# Patient Record
(demographics unavailable — no encounter records)

---

## 2017-11-28 NOTE — PR
[facility]
                                    [address]
                                  [place], [place]  [address]
_________________________________________________________________________________________
                                                                 Signed   
 
 
===================================
PP Progress Notes
===================================
Datetime Report Generated by CPN: 2017 07:44
   
SUBJECTIVE:  [id number]
Pain:  Within normal limits
Nausea/Vomiting:  Denies
Vital Signs:  [id number]
Vital Signs:  Reviewed
Notable Details:  intermittent mild HTN
POSTPARTUM EXAM:  [id number]
Cardiovascular:  Normal
Respiratory:  Normal
Abdomen/Uterus:  Abnormal
Lochia:  Normal
Vulva/Perineum:  Not Done
Breasts:  Not Done
CVA Tenderness:  Not Done
Extremities:  Normal
 Incision:  Normal
Breastfeeding Progress:  Not Applicable
Exam Comments:  Abdomen with active BS.  Fundus firm, NT @ U-1.
      
   H/H 30.8, WBC 17.9, plat 207k
IMPRESSION/PLAN/PROCEDURES:  [id number]
Impression:  Normal postpartum progression
Other Plans:  increase ambulation, shower
Procedures:  None
Progress Notes:  Doing well.  Will continue current care.
Signing Physician:  Sia Saravia MD
 
 
CC:                                                              
 
 
 
 
 
 
 
 
 
*Electronically Signed*  17  0744  SIA SARAVIA MD            
                                                                       
_________________________________________________________________________________________
PATIENT NAME:     JOSE STEPHEN                         
MEDICAL RECORD #: [id number]                     PROGRESS NOTE                 
          ACCT #: [id number]  
DATE OF BIRTH:   86                                       
PHYSICIAN:   SIA SARAVIA MD                   RPT #: [id number]
REPORT IS CONFIDENTIAL AND NOT TO BE RELEASED WITHOUT AUTHORIZATION

## 2017-11-29 NOTE — PR
[facility]
                                    [address]
                                  [place], [place]  [address]
_________________________________________________________________________________________
                                                                 Signed   
 
 
===================================
PP Progress Notes
===================================
Datetime Report Generated by CPN: 2017 09:57
   
SUBJECTIVE:  [id number]
Pain:  Within normal limits
Nausea/Vomiting:  Denies
Flatus:  Yes
Bowel Movement:  Yes
Vital Signs:  [id number]
Vital Signs:  Reviewed
Notable Details:  mild HTN
POSTPARTUM EXAM:  [id number]
Cardiovascular:  Not Done
Respiratory:  Not Done
Abdomen/Uterus:  Abnormal
Lochia:  Normal
Vulva/Perineum:  Not Done
Breasts:  Not Done
CVA Tenderness:  Not Done
Extremities:  Normal
 Incision:  Normal
Breastfeeding Progress:  Not Applicable
Exam Comments:  Abdomen with active BS.  Fundus firm, NT @ U-1.
IMPRESSION/PLAN/PROCEDURES:  [id number]
Impression:  Normal postpartum progression; Pregnancy Induced Hypertension
Plan:  Remove staples; Discharge
Other Plans:  increase ambulation, shower
Procedures:  None
Progress Notes:  Doing well though BPs still slightly increased.  She is ready for D/C.  
Signing Physician:  Sia Saravia MD
 
 
CC:                                                              
 
 
 
 
 
 
 
 
*Electronically Signed*  17  0957  SIA SARAVIA MD            
                                                                       
_________________________________________________________________________________________
PATIENT NAME:     JOSE STEPHEN                         
MEDICAL RECORD #: [id number]                     PROGRESS NOTE                 
          ACCT #: [id number]  
DATE OF BIRTH:   86                                       
PHYSICIAN:   SIA SARAVIA MD                   RPT #: [id number]
REPORT IS CONFIDENTIAL AND NOT TO BE RELEASED WITHOUT AUTHORIZATION

## 2017-12-04 NOTE — OR
[facility]
                                    [address] [place], [place]  [address]
_________________________________________________________________________________________
                                                                 Signed   
 
 
DATE OF OPERATION:
2017
 
SURGEON: Sia Saravia MD
 
FIRST ASSISTANT: Dr. Coronado.
 
PREOPERATIVE DIAGNOSIS: Post-dates pregnancy, non-reassuring fetal status.
 
POSTOPERATIVE DIAGNOSIS: Post-dates pregnancy, non-reassuring fetal status, delivered.
 
PROCEDURE: Primary  section with low segment transverse uterine incision.
 
ANESTHESIA: Spinal.
 
ESTIMATED BLOOD LOSS: 700 mL.
 
DRAINS: Miller catheter.
 
INDICATIONS AND FINDINGS: The patient is a 31-year-old female,  1, para 0,
admitted at 41 weeks for 
induction secondary to postdates.  Her cervix was unfavorable on admission.  She received
one Cytotec after reassuring fetal heart tracing.  However, when she was put back on the 
monitor for the 2nd Cytotec, she was having a prolonged deceleration.  These continued 
despite the fact that she was hardly damon at all. It was felt that the baby 
would not tolerate labor, as she was still closed and there was no indication of the 
delivery that would occur in the near future.  She was counseled and then taken to the OR
for the primary  section.  She was then delivered of a little girl via lower 
segment transverse uterine incision from the ROT position with Apgars of 8, 9 and weight 
of 7 pounds 13 ounces.  There was thick meconium at delivery.  The uterus had evidence of
fibroids in the mid fundus as well as a subserosal fibroid, left superior fundus.
 
PROCEDURE IN DETAIL: The patient was prepped and draped in the supine position.  A
Pfannenstiel skin incision 
was made and carried down through the fascia.  The incision was extended laterally.  The 
inferior and superior fascial flaps were then created.  The peritoneum was opened bluntly
and the incision extended superiorly and inferiorly.  The Romulo retractor was then 
placed.  The incision was made at the superior aspect of the peritoneal reflection.  The 
lower segment was fairly narrow.  The incision was extended bluntly.  The membranes were 
ruptured artificially and the baby delivered with the above findings and handed off to 
the pediatrician in attendance.  Cord gases were obtained as well as cord blood.  The 
placenta was removed manually and the uterus explored with a lap tape showing no 
 
    Electronically Signed By: SIA SARAVIA MD  17 0844
_________________________________________________________________________________________
PATIENT NAME:     JOSE STEPHEN                          
MEDICAL RECORD #: [id number]                     OPERATIVE REPORT              
          ACCT #: [id number]  
DATE OF BIRTH:   86                                       
PHYSICIAN:   SIA SARAVIA MD                 REPORT #: [id number]
REPORT IS CONFIDENTIAL AND NOT TO BE RELEASED WITHOUT AUTHORIZATION
 
 
                                  [facility]
                                    [address] [place] [address]  [address]
_________________________________________________________________________________________
                                                                 Signed   
 
 
remaining fragments.  The edges of the incision were identified and the uterus was closed
in two layers.  The first layer was a running and locking stitch and the second was a 
vertical imbricating stitch.  There were no extensions to the incision.  The abdomen was 
then copiously irrigated, inspected and good hemostasis was again noted.  The retractor 
was removed and the peritoneum identified.  A cell graft was laid over the lower segment 
to aid in healing.  The peritoneum was then closed in a running suture of 3-0 Vicryl.  
The muscles were brought together with interrupted sutures of 0 Vicryl.  Bleeding points 
in the fascia were controlled with cautery.  ACell powder was sprinkled over the muscles 
to aid in healing.  The fascia was then closed from each angle to the midline with a 
running suture of 0 Vicryl.  The subcutaneous tissue was irrigated and bleeding points 
controlled.  Because of the depth of the tissue, another ACell powder was sprinkled in 
the subcu and interrupted sutures of 3-0 Vicryl were placed and closed in the deep space.
 The skin was closed with staples.  All sponge and needle counts were correct.  She 
tolerated the procedure well, was taken to the recovery room in good condition.
 
 
 
 
________________________________________
 
 
Sia Saravia MD 
 
 
 
PJW/MODL
Job #:  [id number]
DD:  2017 07:51:00
DT:  2017 12:27:07
 
cc:
Mahendra Coronado DO
 
 
 
 
 
 
 
 
 
 
 
    Electronically Signed By: SIA SARAVIA MD  17 0844
_________________________________________________________________________________________
PATIENT NAME:     JOSE STEPHEN                          
MEDICAL RECORD #: [id number]                     OPERATIVE REPORT              
          ACCT #: [id number]  
DATE OF BIRTH:   86                                       
PHYSICIAN:   SIA SARAVIA MD                 REPORT #: [id number]
REPORT IS CONFIDENTIAL AND NOT TO BE RELEASED WITHOUT AUTHORIZATION